# Patient Record
Sex: FEMALE | Race: WHITE | NOT HISPANIC OR LATINO | Employment: FULL TIME | URBAN - METROPOLITAN AREA
[De-identification: names, ages, dates, MRNs, and addresses within clinical notes are randomized per-mention and may not be internally consistent; named-entity substitution may affect disease eponyms.]

---

## 2019-04-14 ENCOUNTER — HOSPITAL ENCOUNTER (EMERGENCY)
Facility: MEDICAL CENTER | Age: 59
End: 2019-04-14
Attending: EMERGENCY MEDICINE
Payer: COMMERCIAL

## 2019-04-14 VITALS
WEIGHT: 286.6 LBS | OXYGEN SATURATION: 99 % | HEART RATE: 99 BPM | TEMPERATURE: 98.2 F | SYSTOLIC BLOOD PRESSURE: 120 MMHG | DIASTOLIC BLOOD PRESSURE: 79 MMHG | RESPIRATION RATE: 16 BRPM

## 2019-04-14 DIAGNOSIS — F41.9 ANXIETY: ICD-10-CM

## 2019-04-14 PROCEDURE — A9270 NON-COVERED ITEM OR SERVICE: HCPCS | Performed by: EMERGENCY MEDICINE

## 2019-04-14 PROCEDURE — 99283 EMERGENCY DEPT VISIT LOW MDM: CPT

## 2019-04-14 PROCEDURE — 700102 HCHG RX REV CODE 250 W/ 637 OVERRIDE(OP): Performed by: EMERGENCY MEDICINE

## 2019-04-14 RX ORDER — LORAZEPAM 1 MG/1
1 TABLET ORAL ONCE
Status: COMPLETED | OUTPATIENT
Start: 2019-04-14 | End: 2019-04-14

## 2019-04-14 RX ADMIN — LORAZEPAM 1 MG: 1 TABLET ORAL at 22:50

## 2019-04-15 NOTE — ED PROVIDER NOTES
ED Provider Note    Scribed for Tim Howell M.D. by Magali Car. 4/14/2019  10:33 PM    Primary care provider: None noted  Means of arrival: Ambulance  History obtained from: Patient  History limited by: None    CHIEF COMPLAINT  Chief Complaint   Patient presents with   • Anxiety       HPI  Tala Elmore is a 58 y.o. female with diabetes on metformin who presents to the Emergency Department for evaluation of an anxiety attack onset around 8:15 PM tonight. Prior to panic attack, the patient reports she felt a hot flash throughout her entire body after eating a large meal, but began to worry about the hot flash, and then subsequently had a panic attack. The patient currently reports to feel better and has calmed down. She has a significant history of anxiety which was diagnosed as a teenager, but patient states when she went through menopause, her anxiety increased in severity. The patient eventually had a work up by her PCP done at that time and was found to have a hormone imbalance, therefore she was placed on Lexapro to help manage her anxiety. She denies associated suicidal ideations at this time. The patient has no prior history of DVT or PE.     REVIEW OF SYSTEMS  Pertinent negatives include no suicidal ideations.   See HPI for further details.     PAST MEDICAL HISTORY   Anxiety    SURGICAL HISTORY  patient denies any surgical history    SOCIAL HISTORY  None noted    FAMILY HISTORY  No pertinent family history noted.    CURRENT MEDICATIONS  Lexapro    ALLERGIES  Allergies   Allergen Reactions   • Penicillins        PHYSICAL EXAM  VITAL SIGNS: /79   Pulse 99   Temp 36.8 °C (98.2 °F)   Resp 16   Wt (!) 130 kg (286 lb 9.6 oz)   SpO2 99%   Constitutional: Well developed, Well nourished, No acute distress, Non-toxic appearance.   HENT: Normocephalic, Atraumatic, Bilateral external ears normal, Oropharynx is clear mucous membranes are moist. No oral exudates or nasal discharge.   Eyes: Pupils are equal round  and reactive, EOMI, Conjunctiva normal, No discharge.   Neck: Normal range of motion, No tenderness, Supple, No stridor. No meningismus.  Cardiovascular: Regular rate and rhythm without murmur rub or gallop.  Thorax & Lungs: Clear breath sounds bilaterally without wheezes, rhonchi or rales. There is no chest wall tenderness.   Abdomen: Obese, Soft non-tender non-distended. There is no rebound or guarding. No organomegaly is appreciated. Bowel sounds are normal.  Skin: Normal without rash.   Extremities: Intact distal pulses, No edema, No tenderness, No cyanosis, No clubbing. Capillary refill is less than 2 seconds.  Musculoskeletal: Good range of motion in all major joints. No tenderness to palpation or major deformities noted.   Neurologic: Alert & oriented x 3, Normal motor function, Normal sensory function, No focal deficits noted. Reflexes are normal.  Psychiatric: Affect normal, Judgment normal, Mood normal. There is no suicidal ideation or patient reported hallucinations.     COURSE & MEDICAL DECISION MAKING  Nursing notes, VS, PMSFHx reviewed in chart.    10:33 PM Patient seen and examined at bedside.  No signs of the patient has a serious life or limb threatening medical condition.  She appears to have resolved her panic attack which may have resulted from having a hot flash that she became worried about after eating quite a bit at the buffet.  She is well appearing and vital signs are stable. Patient reports to be feeling better and is calm. She will be treated with Ativan 1 mg for her symptoms and discharged home. The patient is recommended to follow up with PCP. She is understanding and agreeable to discharge.  She is comfortable with this plan of care and will talk with her doctor next week about her symptoms    The patient will return for new or worsening symptoms and is stable at the time of discharge.    DISPOSITION:  Patient will be discharged home in stable condition.    FINAL IMPRESSION  1. Anxiety           I, Magali Car (Valente), am scribing for, and in the presence of, Tim Howell M.D..    Electronically signed by: Magali Car (Valente), 4/14/2019    Tim AVILA M.D. personally performed the services described in this documentation, as scribed by Magali Car in my presence, and it is both accurate and complete.    E.    The note accurately reflects work and decisions made by me.  Tim Howell  4/14/2019  11:33 PM

## 2019-04-15 NOTE — ED TRIAGE NOTES
Pt BIBA after having panic attack secondary to hot flash.    [place] in [place] 26 hours ago, at [facility].  Pt had not eaten or drank anything today, was at buffet.  After eating went to bathroom.  Afterwards she had a very intense hot flash, then became anxious.  EMS arrived, gave 250mL NS via IV.  Pt stated she began to instantly feel better.

## 2025-04-25 ENCOUNTER — APPOINTMENT (OUTPATIENT)
Dept: RADIOLOGY | Facility: MEDICAL CENTER | Age: 65
End: 2025-04-25
Attending: STUDENT IN AN ORGANIZED HEALTH CARE EDUCATION/TRAINING PROGRAM
Payer: COMMERCIAL

## 2025-04-25 ENCOUNTER — HOSPITAL ENCOUNTER (EMERGENCY)
Facility: MEDICAL CENTER | Age: 65
End: 2025-04-25
Attending: STUDENT IN AN ORGANIZED HEALTH CARE EDUCATION/TRAINING PROGRAM
Payer: COMMERCIAL

## 2025-04-25 VITALS
SYSTOLIC BLOOD PRESSURE: 135 MMHG | HEIGHT: 61 IN | RESPIRATION RATE: 19 BRPM | HEART RATE: 99 BPM | WEIGHT: 266 LBS | DIASTOLIC BLOOD PRESSURE: 61 MMHG | TEMPERATURE: 98.8 F | BODY MASS INDEX: 50.22 KG/M2 | OXYGEN SATURATION: 92 %

## 2025-04-25 DIAGNOSIS — R05.1 ACUTE COUGH: ICD-10-CM

## 2025-04-25 DIAGNOSIS — U07.1 COVID-19: Primary | ICD-10-CM

## 2025-04-25 DIAGNOSIS — R55 SYNCOPE AND COLLAPSE: ICD-10-CM

## 2025-04-25 DIAGNOSIS — R11.2 NAUSEA AND VOMITING, UNSPECIFIED VOMITING TYPE: ICD-10-CM

## 2025-04-25 LAB
ALBUMIN SERPL BCP-MCNC: 4.3 G/DL (ref 3.2–4.9)
ALBUMIN/GLOB SERPL: 1.5 G/DL
ALP SERPL-CCNC: 79 U/L (ref 30–99)
ALT SERPL-CCNC: 29 U/L (ref 2–50)
ANION GAP SERPL CALC-SCNC: 14 MMOL/L (ref 7–16)
APPEARANCE UR: CLEAR
AST SERPL-CCNC: 30 U/L (ref 12–45)
BASOPHILS # BLD AUTO: 0.3 % (ref 0–1.8)
BASOPHILS # BLD: 0.03 K/UL (ref 0–0.12)
BILIRUB SERPL-MCNC: 0.4 MG/DL (ref 0.1–1.5)
BILIRUB UR QL STRIP.AUTO: NEGATIVE
BUN SERPL-MCNC: 12 MG/DL (ref 8–22)
CALCIUM ALBUM COR SERPL-MCNC: 9.4 MG/DL (ref 8.5–10.5)
CALCIUM SERPL-MCNC: 9.6 MG/DL (ref 8.5–10.5)
CHLORIDE SERPL-SCNC: 103 MMOL/L (ref 96–112)
CO2 SERPL-SCNC: 21 MMOL/L (ref 20–33)
COLOR UR: YELLOW
CREAT SERPL-MCNC: 0.78 MG/DL (ref 0.5–1.4)
D DIMER PPP IA.FEU-MCNC: 0.66 UG/ML (FEU) (ref 0–0.5)
EKG IMPRESSION: NORMAL
EOSINOPHIL # BLD AUTO: 0.04 K/UL (ref 0–0.51)
EOSINOPHIL NFR BLD: 0.4 % (ref 0–6.9)
ERYTHROCYTE [DISTWIDTH] IN BLOOD BY AUTOMATED COUNT: 49.2 FL (ref 35.9–50)
ETHANOL BLD-MCNC: <10.1 MG/DL
FLUAV RNA SPEC QL NAA+PROBE: NEGATIVE
FLUBV RNA SPEC QL NAA+PROBE: NEGATIVE
GFR SERPLBLD CREATININE-BSD FMLA CKD-EPI: 86 ML/MIN/1.73 M 2
GLOBULIN SER CALC-MCNC: 2.9 G/DL (ref 1.9–3.5)
GLUCOSE SERPL-MCNC: 112 MG/DL (ref 65–99)
GLUCOSE UR STRIP.AUTO-MCNC: >=1000 MG/DL
HCT VFR BLD AUTO: 41.3 % (ref 37–47)
HGB BLD-MCNC: 13.6 G/DL (ref 12–16)
IMM GRANULOCYTES # BLD AUTO: 0.17 K/UL (ref 0–0.11)
IMM GRANULOCYTES NFR BLD AUTO: 1.9 % (ref 0–0.9)
KETONES UR STRIP.AUTO-MCNC: NEGATIVE MG/DL
LEUKOCYTE ESTERASE UR QL STRIP.AUTO: NEGATIVE
LYMPHOCYTES # BLD AUTO: 0.98 K/UL (ref 1–4.8)
LYMPHOCYTES NFR BLD: 11 % (ref 22–41)
MCH RBC QN AUTO: 30.3 PG (ref 27–33)
MCHC RBC AUTO-ENTMCNC: 32.9 G/DL (ref 32.2–35.5)
MCV RBC AUTO: 92 FL (ref 81.4–97.8)
MICRO URNS: ABNORMAL
MONOCYTES # BLD AUTO: 0.94 K/UL (ref 0–0.85)
MONOCYTES NFR BLD AUTO: 10.5 % (ref 0–13.4)
NEUTROPHILS # BLD AUTO: 6.75 K/UL (ref 1.82–7.42)
NEUTROPHILS NFR BLD: 75.9 % (ref 44–72)
NITRITE UR QL STRIP.AUTO: NEGATIVE
NRBC # BLD AUTO: 0 K/UL
NRBC BLD-RTO: 0 /100 WBC (ref 0–0.2)
NT-PROBNP SERPL IA-MCNC: <36 PG/ML (ref 0–125)
PH UR STRIP.AUTO: 6 [PH] (ref 5–8)
PLATELET # BLD AUTO: 286 K/UL (ref 164–446)
PMV BLD AUTO: 9.7 FL (ref 9–12.9)
POTASSIUM SERPL-SCNC: 3.8 MMOL/L (ref 3.6–5.5)
PROT SERPL-MCNC: 7.2 G/DL (ref 6–8.2)
PROT UR QL STRIP: NEGATIVE MG/DL
RBC # BLD AUTO: 4.49 M/UL (ref 4.2–5.4)
RBC UR QL AUTO: NEGATIVE
RSV RNA SPEC QL NAA+PROBE: NEGATIVE
SARS-COV-2 RNA RESP QL NAA+PROBE: DETECTED
SODIUM SERPL-SCNC: 138 MMOL/L (ref 135–145)
SP GR UR STRIP.AUTO: 1.01
TROPONIN T SERPL-MCNC: 15 NG/L (ref 6–19)
UROBILINOGEN UR STRIP.AUTO-MCNC: 0.2 EU/DL
WBC # BLD AUTO: 8.9 K/UL (ref 4.8–10.8)

## 2025-04-25 PROCEDURE — 85379 FIBRIN DEGRADATION QUANT: CPT

## 2025-04-25 PROCEDURE — 82077 ASSAY SPEC XCP UR&BREATH IA: CPT

## 2025-04-25 PROCEDURE — 84484 ASSAY OF TROPONIN QUANT: CPT

## 2025-04-25 PROCEDURE — 71045 X-RAY EXAM CHEST 1 VIEW: CPT

## 2025-04-25 PROCEDURE — 85025 COMPLETE CBC W/AUTO DIFF WBC: CPT

## 2025-04-25 PROCEDURE — 700117 HCHG RX CONTRAST REV CODE 255: Performed by: STUDENT IN AN ORGANIZED HEALTH CARE EDUCATION/TRAINING PROGRAM

## 2025-04-25 PROCEDURE — 81003 URINALYSIS AUTO W/O SCOPE: CPT

## 2025-04-25 PROCEDURE — 36415 COLL VENOUS BLD VENIPUNCTURE: CPT

## 2025-04-25 PROCEDURE — 0241U HCHG SARS-COV-2 COVID-19 NFCT DS RESP RNA 4 TRGT ED POC: CPT

## 2025-04-25 PROCEDURE — 80053 COMPREHEN METABOLIC PANEL: CPT

## 2025-04-25 PROCEDURE — 99285 EMERGENCY DEPT VISIT HI MDM: CPT

## 2025-04-25 PROCEDURE — 71275 CT ANGIOGRAPHY CHEST: CPT

## 2025-04-25 PROCEDURE — 83880 ASSAY OF NATRIURETIC PEPTIDE: CPT

## 2025-04-25 PROCEDURE — 700105 HCHG RX REV CODE 258: Performed by: STUDENT IN AN ORGANIZED HEALTH CARE EDUCATION/TRAINING PROGRAM

## 2025-04-25 PROCEDURE — 93005 ELECTROCARDIOGRAM TRACING: CPT | Mod: TC | Performed by: STUDENT IN AN ORGANIZED HEALTH CARE EDUCATION/TRAINING PROGRAM

## 2025-04-25 RX ORDER — ONDANSETRON 4 MG/1
4 TABLET, ORALLY DISINTEGRATING ORAL EVERY 6 HOURS PRN
Qty: 10 TABLET | Refills: 0 | Status: SHIPPED | OUTPATIENT
Start: 2025-04-25

## 2025-04-25 RX ORDER — SODIUM CHLORIDE, SODIUM LACTATE, POTASSIUM CHLORIDE, CALCIUM CHLORIDE 600; 310; 30; 20 MG/100ML; MG/100ML; MG/100ML; MG/100ML
1000 INJECTION, SOLUTION INTRAVENOUS ONCE
Status: COMPLETED | OUTPATIENT
Start: 2025-04-25 | End: 2025-04-25

## 2025-04-25 RX ADMIN — IOHEXOL 50 ML: 350 INJECTION, SOLUTION INTRAVENOUS at 19:30

## 2025-04-25 RX ADMIN — SODIUM CHLORIDE, POTASSIUM CHLORIDE, SODIUM LACTATE AND CALCIUM CHLORIDE 1000 ML: 600; 310; 30; 20 INJECTION, SOLUTION INTRAVENOUS at 18:27

## 2025-04-26 NOTE — ED PROVIDER NOTES
ED Provider Note    CHIEF COMPLAINT  Chief Complaint   Patient presents with    Dizziness    Weakness     Starting yesterday       EXTERNAL RECORDS REVIEWED  Outpatient Notes patient was seen by her primary care doctor January 15, 2025 and was noted to have history of type 2 diabetes, tobacco abuse, hyperlipidemia, hypertension    HPI/ROS  LIMITATION TO HISTORY   Select: : None  OUTSIDE HISTORIAN(S):  None    Tala Elmore is a 64 y.o. female with history of coronary artery disease, diabetes, hypertension who presents for evaluation after an episode of syncope that occurred today.  Patient is traveling here from [place].  She is staying at a [facility].  She states that yesterday she started to feel like she was coming down with some sort of cold.  This morning she woke up and she had her  go get a COVID test, Tylenol as well as a thermometer.  She took her temperature and it was 100.4.  The COVID test was negative.  She took Tylenol.  They went to go eat lunch and she did not really have an appetite which is not unusual for her.  She took her blood sugar and it was 108.  She states that she typically runs around 130.  She had a croissant as some strawberries and they went to go play the slots.  She won 1200 at the slots and then she got up to walk to her .  After getting up, she started feeling dizzy.  She told her  that she is feeling dizzy.  He had her sit down and then she said that she felt she is going to pass out.  She then passed out and slumped over.  She did not hit her head.  She then woke up shortly after and said that she felt nauseated and vomited.  She states that she was feeling very warm before she is about to pass out.  She has passed out in the past but only once a few years ago.  She has no history of cardiac arrhythmias.  Her  said that there is no seizure-like activity.  Patient then decided to come here to get checked out.  She notes that she feels like she has a bit of  "a cough and feels like she is getting a cold.  She denies any painful urination.  She states that the only thing that she has had to drink today is cranberry juice as well as coffee.  She feels like she could be dehydrated.  She has no leg pain, leg swelling, chest pain, difficulty breathing.  She does smoke cigarettes.  There is no family history of sudden cardiac death.  She has no history of PE or DVT.  She is not on any blood thinners.    PAST MEDICAL HISTORY   has a past medical history of CAD (coronary artery disease), Diabetes (HCC), and Hypertension.    SURGICAL HISTORY  patient denies any surgical history    FAMILY HISTORY  No family history on file.    SOCIAL HISTORY  Social History     Tobacco Use    Smoking status: Every Day     Types: Cigarettes    Smokeless tobacco: Never   Vaping Use    Vaping status: Never Used   Substance and Sexual Activity    Alcohol use: Never    Drug use: Never    Sexual activity: Not on file       CURRENT MEDICATIONS  Home Medications       Reviewed by Shari Nobles, Student (Nurse Apprentice) on 04/25/25 at 1755  Med List Status: Partial     Medication Last Dose Status        Patient Bobby Taking any Medications                         Audit from Redirected Encounters    **Home medications have not yet been reviewed for this encounter**         ALLERGIES  Allergies   Allergen Reactions    Penicillins        PHYSICAL EXAM  VITAL SIGNS: /72   Pulse (!) 109   Temp 37.1 °C (98.8 °F) (Temporal)   Resp 19   Ht 1.549 m (5' 1\")   Wt 121 kg (266 lb)   SpO2 89%   BMI 50.26 kg/m²      Constitutional: Well developed, Well nourished, No acute distress, Non-toxic appearance.   HEENT: Normocephalic, Atraumatic,  external ears normal, pharynx pink,  Mucous  Membranes moist, No rhinorrhea or mucosal edema  Eyes: PERRL, EOMI, Conjunctiva normal, No discharge.   Neck: Normal range of motion, No tenderness, Supple, No stridor.   Cardiovascular: Regular Rate and Rhythm, No murmurs,  " rubs, or gallops.   Thorax & Lungs: Lungs clear to auscultation bilaterally, No respiratory distress, No wheezes, rhales or rhonchi, No chest wall tenderness.   Abdomen: Bowel sounds normal, Soft, non tender, non distended,  No pulsatile masses., no rebound guarding or peritoneal signs.   Skin: Warm, Dry, No erythema, No rash,   Back:  No CVA tenderness,  No spinal tenderness, bony crepitance step offs or instability.   Extremities: Equal, intact distal pulses, No cyanosis, clubbing or edema,  No tenderness.   Musculoskeletal: Good range of motion in all major joints. No tenderness to palpation or major deformities noted.   Neurologic: Alert & oriented No focal deficits noted.  Psychiatric: Affect normal, Judgment normal, Mood normal.    EKG/LABS  Results for orders placed or performed during the hospital encounter of 25   EKG    Collection Time: 25  5:59 PM   Result Value Ref Range    Report       [facility] Emergency Dept.    Test Date:  2025  Pt Name:    CHELSEA ELLER                   Department: ER  MRN:        [id number]                      Room:        02  Gender:     Female                       Technician: [id number]  :        [id number]                   Requested By:ER TRIAGE PROTOCOL  Order #:    [id number]                    Reading MD:    Measurements  Intervals                                Axis  Rate:       99                           P:          6  UT:         158                          QRS:        -16  QRSD:       82                           T:          37  QT:         342  QTc:        439    Interpretive Statements  Sinus rhythm  Borderline left axis deviation  Low voltage, precordial leads  No previous ECG available for comparison     CBC with Differential    Collection Time: 25  6:02 PM   Result Value Ref Range    WBC 8.9 4.8 - 10.8 K/uL    RBC 4.49 4.20 - 5.40 M/uL    Hemoglobin 13.6 12.0 - 16.0 g/dL    Hematocrit 41.3 37.0 - 47.0 %    MCV 92.0 81.4 -  97.8 fL    MCH 30.3 27.0 - 33.0 pg    MCHC 32.9 32.2 - 35.5 g/dL    RDW 49.2 35.9 - 50.0 fL    Platelet Count 286 164 - 446 K/uL    MPV 9.7 9.0 - 12.9 fL    Neutrophils-Polys 75.90 (H) 44.00 - 72.00 %    Lymphocytes 11.00 (L) 22.00 - 41.00 %    Monocytes 10.50 0.00 - 13.40 %    Eosinophils 0.40 0.00 - 6.90 %    Basophils 0.30 0.00 - 1.80 %    Immature Granulocytes 1.90 (H) 0.00 - 0.90 %    Nucleated RBC 0.00 0.00 - 0.20 /100 WBC    Neutrophils (Absolute) 6.75 1.82 - 7.42 K/uL    Lymphs (Absolute) 0.98 (L) 1.00 - 4.80 K/uL    Monos (Absolute) 0.94 (H) 0.00 - 0.85 K/uL    Eos (Absolute) 0.04 0.00 - 0.51 K/uL    Baso (Absolute) 0.03 0.00 - 0.12 K/uL    Immature Granulocytes (abs) 0.17 (H) 0.00 - 0.11 K/uL    NRBC (Absolute) 0.00 K/uL   Complete Metabolic Panel (CMP)    Collection Time: 04/25/25  6:02 PM   Result Value Ref Range    Sodium 138 135 - 145 mmol/L    Potassium 3.8 3.6 - 5.5 mmol/L    Chloride 103 96 - 112 mmol/L    Co2 21 20 - 33 mmol/L    Anion Gap 14.0 7.0 - 16.0    Glucose 112 (H) 65 - 99 mg/dL    Bun 12 8 - 22 mg/dL    Creatinine 0.78 0.50 - 1.40 mg/dL    Calcium 9.6 8.5 - 10.5 mg/dL    Correct Calcium 9.4 8.5 - 10.5 mg/dL    AST(SGOT) 30 12 - 45 U/L    ALT(SGPT) 29 2 - 50 U/L    Alkaline Phosphatase 79 30 - 99 U/L    Total Bilirubin 0.4 0.1 - 1.5 mg/dL    Albumin 4.3 3.2 - 4.9 g/dL    Total Protein 7.2 6.0 - 8.2 g/dL    Globulin 2.9 1.9 - 3.5 g/dL    A-G Ratio 1.5 g/dL   proBrain Natriuretic Peptide, NT (BNP)    Collection Time: 04/25/25  6:02 PM   Result Value Ref Range    NT-proBNP <36 0 - 125 pg/mL   Troponins NOW    Collection Time: 04/25/25  6:02 PM   Result Value Ref Range    Troponin T 15 6 - 19 ng/L   D-DIMER    Collection Time: 04/25/25  6:02 PM   Result Value Ref Range    D-Dimer 0.66 (H) 0.00 - 0.50 ug/mL (FEU)   DIAGNOSTIC ALCOHOL    Collection Time: 04/25/25  6:02 PM   Result Value Ref Range    Diagnostic Alcohol <10.1 <10.1 mg/dL   ESTIMATED GFR    Collection Time: 04/25/25  6:02 PM    Result Value Ref Range    GFR (CKD-EPI) 86 >60 mL/min/1.73 m 2   URINALYSIS,CULTURE IF INDICATED    Collection Time: 04/25/25  6:36 PM    Specimen: Urine, Clean Catch   Result Value Ref Range    Color Yellow     Character Clear     Specific Gravity 1.010 <1.035    Ph 6.0 5.0 - 8.0    Glucose >=1000 (A) Negative mg/dL    Ketones Negative Negative mg/dL    Protein Negative Negative mg/dL    Bilirubin Negative Negative    Urobilinogen, Urine 0.2 <=1.0 EU/dL    Nitrite Negative Negative    Leukocyte Esterase Negative Negative    Occult Blood Negative Negative    Micro Urine Req see below    POC CoV-2, FLU A/B, RSV by PCR    Collection Time: 04/25/25  6:38 PM   Result Value Ref Range    POC Influenza A RNA, PCR Negative Negative    POC Influenza B RNA, PCR Negative Negative    POC RSV, by PCR Negative Negative    POC SARS-CoV-2, PCR DETECTED (AA) NotDetected       I have independently interpreted this EKG    RADIOLOGY/PROCEDURES   I have independently interpreted the diagnostic imaging associated with this visit and am waiting the final reading from the radiologist.   My preliminary interpretation is as follows: no PE    Radiologist interpretation:  CT-CTA CHEST PULMONARY ARTERY W/ RECONS   Final Result      1.  No CT evidence of pulmonary embolism.   2.  Patchy linear bibasilar opacities, likely atelectasis.         DX-CHEST-PORTABLE (1 VIEW)   Final Result         1. No acute cardiopulmonary abnormalities are identified.          COURSE & MEDICAL DECISION MAKING    ASSESSMENT, COURSE AND PLAN  Care Narrative:   This is a 64-year-old female with history as noted above who is presenting after an episode of syncope at the [facility].  Patient has been having flulike symptoms since yesterday.  On arrival, she is mildly tachycardic but otherwise is stable.  She does have intermittent hypoxia to the upper 80s however she self recovered without difficulty.  Differential diagnosis includes but not limited to COVID, influenza,  pneumonia, PE, arrhythmia, dehydration, new onset heart failure, orthostatic hypotension, vasovagal syncope.    Labs were obtained and there is no leukocytosis.  Electrolytes within normal limits.  Kidney function is normal.  EKG demonstrates no evidence of arrhythmia.  D-dimer screen was performed and is elevated therefore CT PE was done and there is no evidence of PE.  There is no evidence of pneumonia on the CT either.  Her troponin is not elevated and she has no chest pain, clinical history is not consistent with ACS.  UA negative for infection.  She was found to be COVID-positive.  She does not have any persistent hypoxia to require admission to the hospital.  Patient states that she always takes Paxlovid when she gets COVID.  This is reasonable given she is a diabetic and does smoke cigarettes.  I did give her a paper prescription to take to the pharmacy for this.  She is advised to start the Paxlovid as soon as possible if she is going to take it.  She is advised not to take her statin while she is on the Paxlovid due to drug interactions.  I prescribed her Zofran as she did have episode of vomiting earlier today.    In terms of her syncope, it sounds that maybe it was somewhat orthostatic as she had just stood up.  She had not been hydrating well today.  She was given IV fluid bolus here.  There is no family history of sudden cardiac death.  She has no history of heart problems.  Do not feel that she needs to stay for syncope workup.    Patient is advised to follow-up with her primary care doctor.  Strict ER return precautions were discussed including difficulty breathing, persistent syncope, chest pain or any other concerns.  Patient and her  are agreeable to discharge plan with no further questions.    Hydration: Based on the patient's presentation of Acute Vomiting the patient was given IV fluids. IV Hydration was used because oral hydration was not adequate alone. Upon recheck following hydration,  the patient was improved.          ADDITIONAL PROBLEMS MANAGED  Syncope    DISPOSITION AND DISCUSSIONS  I have discussed management of the patient with the following physicians and GELA's:  None    Discussion of management with other [facility] or appropriate source(s): None     Escalation of care considered, and ultimately not performed:acute inpatient care management, however at this time, the patient is most appropriate for outpatient management    Barriers to care at this time, including but not limited to:  None .     Decision tools and prescription drugs considered including, but not limited to: Antivirals paxlovid .     The patient will return for new or worsening symptoms and is stable at the time of discharge.    The patient is referred to a primary physician for blood pressure management, diabetic screening, and for all other preventative health concerns.      DISPOSITION:  Patient will be discharged home in stable condition.    FOLLOW UP:  Your primary care doctor          [facility], Emergency Dept  [address] [address] [phone]          OUTPATIENT MEDICATIONS:  Discharge Medication List as of 4/25/2025  8:54 PM        START taking these medications    Details   ondansetron (ZOFRAN ODT) 4 MG TABLET DISPERSIBLE Take 1 Tablet by mouth every 6 hours as needed for Nausea/Vomiting., Disp-10 Tablet, R-0, Normal      Nirmatrelvir&Ritonavir 300/100 20 x 150 MG & 10 x 100MG Tablet Therapy Pack Take 300 mg nirmatrelvir (two 150 mg tablets) with 100 mg  ritonavir (one 100 mg tablet) by mouth, with all three tablets taken together  twice daily for 5 days., Disp-30 Each, R-0, Print Rx Paper               FINAL DIAGNOSIS  1. COVID-19 Acute   2. Syncope and collapse Acute   3. Nausea and vomiting, unspecified vomiting type Acute   4. Acute cough Acute        Electronically signed by: Hanna Inman M.D., 4/25/2025 6:02 PM

## 2025-04-26 NOTE — ED NOTES
Bedside report received from off going RN/tech: Shari, assumed care of patient.  POC discussed with patient. Call light within reach, all needs addressed at this time.       Fall risk interventions in place: Move the patient closer to the nurse's station, Patient's personal possessions are with in their safe reach, Place socks on patient, and Place fall risk sign on patient's door (all applicable per [facility] Fall risk assessment)   Continuous monitoring: Cardiac Leads, Pulse Ox, or Blood Pressure  IVF/IV medications: Infusion per MAR (List Med(s)) LR  Oxygen: Room Air  Bedside sitter: Not Applicable   Isolation: Isolation precautions for covid (Isolation order)

## 2025-04-26 NOTE — ED NOTES
Patient ready for discharge. PIV removed. Instructions and rx given to patient. Patient educated on when/if to return to ED and follow-up appts. With PCP. Patient verbalized understanding.

## 2025-04-26 NOTE — ED TRIAGE NOTES
".  Chief Complaint   Patient presents with    Dizziness    Weakness     Starting yesterday     Pt GIOVANI Christensen from [facility]. Pt reports onset of cough, weakness since yesterday. Pt reports having a syncopal event witnessed by  while she was sitting claire wheelchair. Pt reports not drinking water or eating today, but only drinking cranberry juice. Pt's BG was 105 per EMS.     /72   Pulse (!) 109   Temp 37.1 °C (98.8 °F) (Temporal)   Resp 19   Ht 1.549 m (5' 1\")   Wt 121 kg (266 lb)   SpO2 89%   BMI 50.26 kg/m²     "

## 2025-04-26 NOTE — DISCHARGE INSTRUCTIONS
You were seen for evaluation after you passed out.  You were found to have COVID.  Please take Tylenol and Motrin as needed.  I prescribed you Zofran for nausea and vomiting.  Please follow-up with your primary care doctor.  Return emergency room for any further episodes of passing out, difficulty breathing or any other concerns.    CT-CTA CHEST PULMONARY ARTERY W/ RECONS   Final Result      1.  No CT evidence of pulmonary embolism.   2.  Patchy linear bibasilar opacities, likely atelectasis.         DX-CHEST-PORTABLE (1 VIEW)   Final Result         1. No acute cardiopulmonary abnormalities are identified.        Results for orders placed or performed during the hospital encounter of 25   EKG    Collection Time: 25  5:59 PM   Result Value Ref Range    Report       [facility]    Test Date:  2025  Pt Name:    CHELSEA ELLER                   Department: ER  MRN:        [id number]                      Room:       [facility]  Gender:     Female                       Technician: [id number]  :        [id number]                   Requested By:ER TRIAGE PROTOCOL  Order #:    [id number]                    Reading MD:    Measurements  Intervals                                Axis  Rate:       99                           P:          6  MS:         158                          QRS:        -16  QRSD:       82                           T:          37  QT:         342  QTc:        439    Interpretive Statements  Sinus rhythm  Borderline left axis deviation  Low voltage, precordial leads  No previous ECG available for comparison     CBC with Differential    Collection Time: 25  6:02 PM   Result Value Ref Range    WBC 8.9 4.8 - 10.8 K/uL    RBC 4.49 4.20 - 5.40 M/uL    Hemoglobin 13.6 12.0 - 16.0 g/dL    Hematocrit 41.3 37.0 - 47.0 %    MCV 92.0 81.4 - 97.8 fL    MCH 30.3 27.0 - 33.0 pg    MCHC 32.9 32.2 - 35.5 g/dL    RDW 49.2 35.9 - 50.0 fL    Platelet Count 286 164 - 446 K/uL    MPV  9.7 9.0 - 12.9 fL    Neutrophils-Polys 75.90 (H) 44.00 - 72.00 %    Lymphocytes 11.00 (L) 22.00 - 41.00 %    Monocytes 10.50 0.00 - 13.40 %    Eosinophils 0.40 0.00 - 6.90 %    Basophils 0.30 0.00 - 1.80 %    Immature Granulocytes 1.90 (H) 0.00 - 0.90 %    Nucleated RBC 0.00 0.00 - 0.20 /100 WBC    Neutrophils (Absolute) 6.75 1.82 - 7.42 K/uL    Lymphs (Absolute) 0.98 (L) 1.00 - 4.80 K/uL    Monos (Absolute) 0.94 (H) 0.00 - 0.85 K/uL    Eos (Absolute) 0.04 0.00 - 0.51 K/uL    Baso (Absolute) 0.03 0.00 - 0.12 K/uL    Immature Granulocytes (abs) 0.17 (H) 0.00 - 0.11 K/uL    NRBC (Absolute) 0.00 K/uL   Complete Metabolic Panel (CMP)    Collection Time: 04/25/25  6:02 PM   Result Value Ref Range    Sodium 138 135 - 145 mmol/L    Potassium 3.8 3.6 - 5.5 mmol/L    Chloride 103 96 - 112 mmol/L    Co2 21 20 - 33 mmol/L    Anion Gap 14.0 7.0 - 16.0    Glucose 112 (H) 65 - 99 mg/dL    Bun 12 8 - 22 mg/dL    Creatinine 0.78 0.50 - 1.40 mg/dL    Calcium 9.6 8.5 - 10.5 mg/dL    Correct Calcium 9.4 8.5 - 10.5 mg/dL    AST(SGOT) 30 12 - 45 U/L    ALT(SGPT) 29 2 - 50 U/L    Alkaline Phosphatase 79 30 - 99 U/L    Total Bilirubin 0.4 0.1 - 1.5 mg/dL    Albumin 4.3 3.2 - 4.9 g/dL    Total Protein 7.2 6.0 - 8.2 g/dL    Globulin 2.9 1.9 - 3.5 g/dL    A-G Ratio 1.5 g/dL   proBrain Natriuretic Peptide, NT (BNP)    Collection Time: 04/25/25  6:02 PM   Result Value Ref Range    NT-proBNP <36 0 - 125 pg/mL   Troponins NOW    Collection Time: 04/25/25  6:02 PM   Result Value Ref Range    Troponin T 15 6 - 19 ng/L   D-DIMER    Collection Time: 04/25/25  6:02 PM   Result Value Ref Range    D-Dimer 0.66 (H) 0.00 - 0.50 ug/mL (FEU)   DIAGNOSTIC ALCOHOL    Collection Time: 04/25/25  6:02 PM   Result Value Ref Range    Diagnostic Alcohol <10.1 <10.1 mg/dL   ESTIMATED GFR    Collection Time: 04/25/25  6:02 PM   Result Value Ref Range    GFR (CKD-EPI) 86 >60 mL/min/1.73 m 2   URINALYSIS,CULTURE IF INDICATED    Collection Time: 04/25/25  6:36 PM     Specimen: Urine, Clean Catch   Result Value Ref Range    Color Yellow     Character Clear     Specific Gravity 1.010 <1.035    Ph 6.0 5.0 - 8.0    Glucose >=1000 (A) Negative mg/dL    Ketones Negative Negative mg/dL    Protein Negative Negative mg/dL    Bilirubin Negative Negative    Urobilinogen, Urine 0.2 <=1.0 EU/dL    Nitrite Negative Negative    Leukocyte Esterase Negative Negative    Occult Blood Negative Negative    Micro Urine Req see below    POC CoV-2, FLU A/B, RSV by PCR    Collection Time: 04/25/25  6:38 PM   Result Value Ref Range    POC Influenza A RNA, PCR Negative Negative    POC Influenza B RNA, PCR Negative Negative    POC RSV, by PCR Negative Negative    POC SARS-CoV-2, PCR DETECTED (AA) NotDetected

## 2025-04-26 NOTE — ED NOTES
Patient back from CT, hooked up to monitor, updated on POC. No further needs or concerns at this time.